# Patient Record
Sex: FEMALE | Race: OTHER
[De-identification: names, ages, dates, MRNs, and addresses within clinical notes are randomized per-mention and may not be internally consistent; named-entity substitution may affect disease eponyms.]

---

## 2017-02-19 ENCOUNTER — HOSPITAL ENCOUNTER (INPATIENT)
Dept: HOSPITAL 26 - MED | Age: 47
LOS: 1 days | Discharge: LEFT BEFORE DISCHARGE | DRG: 243 | End: 2017-02-20
Attending: INTERNAL MEDICINE | Admitting: INTERNAL MEDICINE
Payer: COMMERCIAL

## 2017-02-19 VITALS — DIASTOLIC BLOOD PRESSURE: 59 MMHG | SYSTOLIC BLOOD PRESSURE: 102 MMHG

## 2017-02-19 VITALS — DIASTOLIC BLOOD PRESSURE: 54 MMHG | SYSTOLIC BLOOD PRESSURE: 101 MMHG

## 2017-02-19 VITALS — DIASTOLIC BLOOD PRESSURE: 60 MMHG | SYSTOLIC BLOOD PRESSURE: 101 MMHG

## 2017-02-19 VITALS — DIASTOLIC BLOOD PRESSURE: 71 MMHG | SYSTOLIC BLOOD PRESSURE: 111 MMHG

## 2017-02-19 VITALS — DIASTOLIC BLOOD PRESSURE: 59 MMHG | SYSTOLIC BLOOD PRESSURE: 100 MMHG

## 2017-02-19 VITALS — DIASTOLIC BLOOD PRESSURE: 55 MMHG | SYSTOLIC BLOOD PRESSURE: 92 MMHG

## 2017-02-19 VITALS — DIASTOLIC BLOOD PRESSURE: 70 MMHG | SYSTOLIC BLOOD PRESSURE: 111 MMHG

## 2017-02-19 VITALS — BODY MASS INDEX: 21.86 KG/M2 | WEIGHT: 128.03 LBS | HEIGHT: 64 IN

## 2017-02-19 VITALS — SYSTOLIC BLOOD PRESSURE: 104 MMHG | DIASTOLIC BLOOD PRESSURE: 71 MMHG

## 2017-02-19 VITALS — DIASTOLIC BLOOD PRESSURE: 74 MMHG | SYSTOLIC BLOOD PRESSURE: 133 MMHG

## 2017-02-19 VITALS — DIASTOLIC BLOOD PRESSURE: 67 MMHG | SYSTOLIC BLOOD PRESSURE: 104 MMHG

## 2017-02-19 VITALS — SYSTOLIC BLOOD PRESSURE: 104 MMHG | DIASTOLIC BLOOD PRESSURE: 65 MMHG

## 2017-02-19 DIAGNOSIS — K70.30: ICD-10-CM

## 2017-02-19 DIAGNOSIS — K76.6: ICD-10-CM

## 2017-02-19 DIAGNOSIS — D62: ICD-10-CM

## 2017-02-19 DIAGNOSIS — K22.11: Primary | ICD-10-CM

## 2017-02-19 DIAGNOSIS — Z91.018: ICD-10-CM

## 2017-02-19 DIAGNOSIS — E87.6: ICD-10-CM

## 2017-02-19 DIAGNOSIS — Z53.21: ICD-10-CM

## 2017-02-19 DIAGNOSIS — Z88.6: ICD-10-CM

## 2017-02-19 DIAGNOSIS — K92.89: ICD-10-CM

## 2017-02-19 DIAGNOSIS — F10.21: ICD-10-CM

## 2017-02-19 DIAGNOSIS — Z79.1: ICD-10-CM

## 2017-02-19 DIAGNOSIS — I85.00: ICD-10-CM

## 2017-02-19 PROCEDURE — 30233N1 TRANSFUSION OF NONAUTOLOGOUS RED BLOOD CELLS INTO PERIPHERAL VEIN, PERCUTANEOUS APPROACH: ICD-10-PCS | Performed by: INTERNAL MEDICINE

## 2017-02-19 PROCEDURE — 0W3P8ZZ CONTROL BLEEDING IN GASTROINTESTINAL TRACT, VIA NATURAL OR ARTIFICIAL OPENING ENDOSCOPIC: ICD-10-PCS | Performed by: INTERNAL MEDICINE

## 2017-02-19 RX ADMIN — PANTOPRAZOLE SODIUM SCH MLS/HR: 40 INJECTION, POWDER, FOR SOLUTION INTRAVENOUS at 12:01

## 2017-02-19 RX ADMIN — PANTOPRAZOLE SODIUM SCH MLS/HR: 40 INJECTION, POWDER, FOR SOLUTION INTRAVENOUS at 20:39

## 2017-02-19 NOTE — NUR
ADMITTED PT FROM ER TO ICU BED 6, PT IS AWAKE, ALERT,AND ORIENTATED. BEDSIDE MONITOR SHOWS 
ST. PT ON ROOM AIR, NO S/S OF RESPIRATORY DISTRESS NOTED. LING SOUNDS CLEAR. ABDOMEN SOFT 
WITH ACTIVE BOWEL SOUND. SKIN INTACT, PT IS ABLE TO MOVE ALL HER EXTREMITIES. IV TO RIGHT AC 
#20 RUNNING D5 1/2 NS KCL 40 MEQ 1000 ML  ML/HR, SITE INTACT AND PATENT. PT ALSO HAS 
IV TO LEFT HAND #20, SALINE LOCKED. POC DISCUSSED WITH PT, PT VERBALIZED UNDERSTANDING. FULL 
CODE, NO FEVER. SIDE RAILS UPX2 WITH LOW BED POSITION, HOB ELEVATED 30 DEGREES. WILL 
CONTINUE TO MONITOR.

## 2017-02-19 NOTE — NUR
PT IS AWAKE, ALERT AND ORIENTATED. GI NURSES PRESENTED AT PT BEDSIDE TO GET PT READY FOR   
ESOPHAGOGASTRODUODENOSCOPY.

## 2017-02-19 NOTE — NUR
PT RESTING IN BED. TALKING WITH HER FRIEND. NO S/S OF RESPIRATORY DISTRESS NOTED. CALL LIGHT 
IN REACH. SAFETY MAINTAINED. WILL CONTINUE TO MONITOR.

## 2017-02-19 NOTE — NUR
PATIENT REPORTS "FEELING ANTSY". PAGED DR. ARANDA, WHO IS ON CALL FOR DR. PIERRE, FOR 
ORDERS. WILL WAIT FOR CALL BACK.

## 2017-02-19 NOTE — NUR
STARTED PT ON BLOOD TRANSFUSION. VERIFIED WITH CHARGE NURSE VICTOR M HEATH. ORAL TEMP CHECKED 99 
F DEGREE. PT DENIES ANY PAIN OR DISCOMFORT AT THIS TIME.

## 2017-02-19 NOTE — NUR
RECEIVED BEDSIDE REPORT FROM RASTA HEATH. PATIENT IS ALERT, AWAKE, AND WATCHING TV. NO SIGNS OF 
RESPIRATORY DISTRESS OR SOB NOTED. PATIENT IS ON ROOM AIR WITH OXYGEN SATURATION %. 
VITAL SIGNS ARE STABLE. NO REPORTS OF PAIN OR DISCOMFORT AT THIS TIME. BREATH SOUNDS ARE 
CLEAR UPON AUSCULTATION. BOWEL SOUNDS ARE ACTIVE. THERE IS A #20 IN THE PATIENT'S RIGHT AC 
RECEIVING PROTONIX 80MG IN NORMAL SALINE 0.9% AT 10 ML/HR AND 0.30% POTASSIUM CHLORIDE IN 
D5% AND 1/2 NS AT 50 ML/HR. SITE IS DRY, INTACT, AND PATENT. THERE IS ALSO A #20 IN THE LEFT 
HAND SALINE LOCK. SITE IS DRY, INTACT, AND ASYMPTOMATIC. SCDS ARE IN PLACE FOR VTE 
PROPHYLAXIS. EXPLAINED PLAN OF CARE TONIGHT TO PATIENT TO INCLUDE MONITORING, VITAL SIGNS, 
ADMINISTRATION OF PRBCS RELATED TO DX OF SEVERE SYMPTOMATIC ANEMIA. PATIENT VERBALIZED 
UNDERSTANDING. PATIENT'S NEEDS MET AT THIS TIME. CALL LIGHT WITHIN REACH. HOB AT 30 DEGREES 
WITH BED IN LOW POSITION. SIDE RAILS UP X2. WILL CONTINUE TO MONITOR PATIENT.

## 2017-02-19 NOTE — NUR
IS STILL HERE AND MADE AWARE OF PATIENT STILL HAS IV FLUID WITH 1000 ML OF D5/0.45 
NS + KCL 40 MEQ INFUSING.  SAID THAT TO DECREASE PRESENT IV FLUID TO 50 ML/HR UNTIL 
IT IS DONE THEN KEEP NS TKO RATE .

## 2017-02-19 NOTE — NUR
SPOKE TO DR. ARANDA. NEW ORDERS GIVEN. ATIVAN 1MG IVP PRN Q6H FOR 
ANXIETY/RESTLESS/INSOMNIA. WILL FOLLOW UP WITH NEW DOCTOR'S ORDERS.

## 2017-02-19 NOTE — NUR
FIRST BAG OF BLOOD TRANSFUSION IS COMPLETED. PT TOLERATED WELL, NO SIGNS OF BLOOD REACTIONS. 
CHEST X-RAY DONE AT THIS TIME.

## 2017-02-19 NOTE — NUR
PATIENT REQUESTED SNACK. PROVIDED PATIENT WITH 1 BOX OF APPLE JUICE AND 1 CUP OF JELLO. 
PATIENT'S NEEDS MET AT THIS TIME. CONTINUE TO MONITOR.

## 2017-02-19 NOTE — NUR
STARTED PT ON PROTONIX 80 MG IN  ML DRIP AT 10 MLS/HR. NO S/S OF RESPIRATORY DISTRESS 
NOTED, WILL CONTINUE TO MONITOR.

## 2017-02-19 NOTE — NUR
Patient will be admitted to care of DR. DAVIS. Admited to ICU 6.  Will go 
to room 6. Belongings list completed.  Report to RASTA HEATH

## 2017-02-19 NOTE — NUR
CALLED BLOOD BANK TO CHECK IF ANOTHER 2 UNIT OF PACKED CELLS IS READY YET.  PER  BLOOD BANK 
PERSONAL : STILL WAITING FOR THE BLOOD TO DELIVERING FROM [facility] .  WILL 
ENDORSE TO NIGHT SHIFT NURSE.

## 2017-02-19 NOTE — NUR
THIRD UNIT OF PRBC COMPLETE. VITALS ARE WNL. NO REPORTS OF PAIN OR DISCOMFORT. CONTINUE TO 
MONITOR.

## 2017-02-19 NOTE — NUR
PATIENT REQUESTED SNACK. PROVIDED 1 CUP OF JELLO AND 1 CUP OF CHICKEN BROTH. PATIENT'S NEEDS 
MET AT THIS TIME. HOB AT 30 DEGREES WTIH BED IN LOW POSITION. CONTINUE TO MONITOR PATIENT.

## 2017-02-19 NOTE — NUR
PT AWAKE, ALERT,AND ORIENTATED.  CAME IN AT 0945 TO EXPLAIN EGD PROCEDURE TO 
PT. PT VERBALIZED UNDERSTANDING AND AGREED FOR PROCEDURE. PT SIGNED EGD CONSENT AT 0948 , GI 
NURSES PRESENTED AS WITNESS.

## 2017-02-19 NOTE — NUR
WAS NOTIFIED OF THE PATIENT ADMISSION AND UPDATED IN PATIENT'S CONDITION WHICH 
INCLUDED THE REPORT OF POST EGD PROCEDURE. ALSO  MADE AWARE OF PATIENT IS GOING TO 
HAVE 4 UNIT OF PRBC  AND THE BLOOD IS NOT READY YET. PER :WILL SEE THE PATIENT IN ONE 
HOUR.

## 2017-02-19 NOTE — NUR
PATIENT VOIDED 300ML OF LIGHT ROSAMARIA COLORED URINE. PERINEAL CARE PROVIDED. PATIENT'S NEEDS 
MET AT THIS TIME. CALL LIGHT WITHIN REACH.

## 2017-02-19 NOTE — NUR
FOURTH UNIT OF PRBC INITIATED. VITALS ARE STABLE WITH NO REPORTS OF PAIN OR DISCOMFORT. 
CONTINUE TO MONITOR.

## 2017-02-19 NOTE — NUR
PT AWAKE, ALERT, ORIENTED. THE SECOND BAG OF BLOOD TRANSFUSION IS COMPLETED.  PT TOLERATED 
WELL. NO S/S OF RESPIRATORY NOTED. WILL CONTINUE TO MONITOR.

## 2017-02-19 NOTE — NUR
PAGED DR. ARANDA  TO RELAY ABNORMAL LAB RESULTS TAKEN AT 2000H; ORDERED TO GO ON 
TRANSFUSING 2 UNITS PRBC AND TO GIVE PATIENT ORAL POTASSIUM SUPPLEMENT X 1 DOSE; CARRIED 
OUT.

## 2017-02-19 NOTE — NUR
ADMITTED PT TO ICU BED 6, PT IS AWAKE, ALERT, AND ORIENTATED. BEDSIDE MONITOR 
SHOWS ST. ON ROOM AIR, NO S/S OF RESPIRATORY DISTRESS NOTED. LUNG SOUNDS CLEAR, 
ABDOMEN SOFT WITH ACTIVE BOWEL SOUND. IV TO RIGHT AC #20 RUNNING D5+1/2 NS WITH 
40 MEQ KCL  ML /HR. SITE INTACT AND PATENT. SKIN INTACT, PT IS ABLE TO 
MOVE ALL HER EXTREMITIES. SIDE RAILS UPX2 WITH LOW BED POSITION, HOB ELEVATED 
30 DEGREES, POC DISCUSSED WITH PT, PT VERBALIZED UNDERSTANDING. CALL LIGHT IN 
REACH, WILL CONTINUE TO MONITOR.

## 2017-02-19 NOTE — NUR
PT BIBA FOR EVALUATION OF VOMITING LAST NOC. PT STATES SHE HAS NOT VOMITED THIS 
AM, JUST LAST NOC. HX CIRRHOSIS, ANXIETY; SKIN IS PINK/WARM/DRY; AAOX4 WITH 
EVEN AND STEADY GAIT; LUNGS CLEAR BL; HR EVEN AND REGULAR; PT DENIES ANY FEVER, 
CP, SOB, OR COUGH AT THIS TIME; PATIENT STATES PAIN OF 0/10 AT THIS TIME; VSS; 
PATIENT POSITIONED FOR COMFORT; HOB ELEVATED; BEDRAILS UP X2; BED DOWN. ER MD 
MADE AWARE OF PT STATUS.

## 2017-02-20 VITALS — DIASTOLIC BLOOD PRESSURE: 78 MMHG | SYSTOLIC BLOOD PRESSURE: 126 MMHG

## 2017-02-20 VITALS — DIASTOLIC BLOOD PRESSURE: 64 MMHG | SYSTOLIC BLOOD PRESSURE: 106 MMHG

## 2017-02-20 VITALS — SYSTOLIC BLOOD PRESSURE: 108 MMHG | DIASTOLIC BLOOD PRESSURE: 63 MMHG

## 2017-02-20 VITALS — DIASTOLIC BLOOD PRESSURE: 75 MMHG | SYSTOLIC BLOOD PRESSURE: 102 MMHG

## 2017-02-20 VITALS — SYSTOLIC BLOOD PRESSURE: 105 MMHG | DIASTOLIC BLOOD PRESSURE: 71 MMHG

## 2017-02-20 VITALS — SYSTOLIC BLOOD PRESSURE: 96 MMHG | DIASTOLIC BLOOD PRESSURE: 63 MMHG

## 2017-02-20 VITALS — DIASTOLIC BLOOD PRESSURE: 57 MMHG | SYSTOLIC BLOOD PRESSURE: 96 MMHG

## 2017-02-20 VITALS — SYSTOLIC BLOOD PRESSURE: 100 MMHG | DIASTOLIC BLOOD PRESSURE: 66 MMHG

## 2017-02-20 VITALS — DIASTOLIC BLOOD PRESSURE: 63 MMHG | SYSTOLIC BLOOD PRESSURE: 90 MMHG

## 2017-02-20 RX ADMIN — PANTOPRAZOLE SODIUM SCH MLS/HR: 40 INJECTION, POWDER, FOR SOLUTION INTRAVENOUS at 06:39

## 2017-02-20 NOTE — NUR
WAS NOTIFIED PT LEFT AMA.

-------------------------------------------------------------------------------

Addendum: 02/20/17 at 1819 by Gigi Peres RN

-------------------------------------------------------------------------------

ERROR IN TIME. 1650 INSTEAD OF 1450

## 2017-02-20 NOTE — NUR
FOURTH UNIT OF PRBC COMPLETED. VITALS ARE STABLE. NO SIGNS OF SOB OR DISCOMFORT NOTED. 
PATIENT SLEEPING AT THIS TIME. CALL LIGHT WITHIN REACH. CONTINUE TO MONITOR.

## 2017-02-20 NOTE — NUR
CM NOTE

AS PER  VISH EXT 1145, REVIEWS SHOULD ONLY GO TO [facility]. SENT 
INITIAL REVIEW TO [facility] FAX# [phone] PH# [phone]

## 2017-02-20 NOTE — NUR
PT LEFT ICU, AMA. ACCOMPANIED BY ADRIANA. NOTIFIED NURSE SUPERVISOR KATHARINE AND ADMITTING 
OFFICE. /85. . O2 98%.NO S/S OF RESPIRATORY DISTRESS NOTED AT THIS TIME.

-------------------------------------------------------------------------------

Addendum: 02/20/17 at 1715 by Gigi Peres RN

-------------------------------------------------------------------------------

PRESCRIPTION GIVEN. MEDICATION EDUCATION , SIDE EFFECTS AND PURPOSE OF MED GIVEN. ARMBANDS 
ARE REMOVED.

## 2017-02-20 NOTE — NUR
PATIENT ASLEEP IN BED. NO S/S OF SOB OR RESPIRATORY DISTRESS NOTED. CALL LIGHT WITHIN REACH. 
CONTINUE TO MONITOR PATIENT.

## 2017-02-20 NOTE — NUR
RECEIVED BEDSIDE REPORT FROM TAYLOR HEATH. PT IS SLEEPING BUT EASILY AWAKING BY INITIAL 
ASSESSMENT. BEDSIDE MONITOR SHOWS SR. NO S/S OF RESPIRATORY DISTRESS NOTED, LUNG SOUNDS 
CLEAR. ABDOMEN SOFT WITH ACTIVE BOWEL SOUNDS. SKIN INTACT, PT CAN MOVE ALL HER EXTREMITIES. 
SCDS IN PLACE. IV TO RIGHT AC RECEIVING PROTONIX 80 MG IN  AT 10 ML/HR. THIS IS ALSO A 
# 20 IV TO LEFT HAND, SALINE LOCKED. SITE INTACT AND PATENT. SIDE RAILS UP X2 WITH LOW BED 
POSITION, HOB ELEVATED 30 DEGREES. CALL LIGHT IN REACH.WILL CONTINUE TO MONITOR.

## 2017-02-20 NOTE — NUR
PATIENT HAS BEEN SCREENED AND CATEGORIZED AS MODERATE NUTRITION RISK. PATIENT WILL BE SEEN 
WITHIN 3-5 DAYS OF ADMISSION.



2/21/17-2/23/17



[address]GA [address] RD

## 2017-02-20 NOTE — NUR
ROUNDED ON PATIENT. PATIENT SLEEPING IN BED. NO SIGNS OF SOB OR DISCOMFORT NOTED. HOB AT 30 
DEGREES WITH BED IN LOW POSITION. CALL LIGHT WITHIN REACH. WILL CONTINUE TO MONITOR PATIENT.

## 2017-02-20 NOTE — NUR
PATIENT SLEEPING AND IS STABLE. ALL PATIENT'S NEEDS ATTENDED TO DURING SHIFT. ENDORSED 
CONTINUITY OF CARE TO RASTA HEATH.

## 2017-02-21 ENCOUNTER — HOSPITAL ENCOUNTER (EMERGENCY)
Dept: HOSPITAL 26 - MED | Age: 47
Discharge: HOME | End: 2017-02-21
Payer: COMMERCIAL

## 2017-02-21 VITALS — WEIGHT: 130 LBS | BODY MASS INDEX: 22.2 KG/M2 | HEIGHT: 64 IN

## 2017-02-21 VITALS — SYSTOLIC BLOOD PRESSURE: 127 MMHG | DIASTOLIC BLOOD PRESSURE: 87 MMHG

## 2017-02-21 VITALS — DIASTOLIC BLOOD PRESSURE: 80 MMHG | SYSTOLIC BLOOD PRESSURE: 128 MMHG

## 2017-02-21 DIAGNOSIS — K70.10: ICD-10-CM

## 2017-02-21 DIAGNOSIS — R18.8: ICD-10-CM

## 2017-02-21 DIAGNOSIS — Z90.49: ICD-10-CM

## 2017-02-21 DIAGNOSIS — K92.2: Primary | ICD-10-CM

## 2017-02-21 DIAGNOSIS — D64.9: ICD-10-CM

## 2017-02-21 DIAGNOSIS — Z88.5: ICD-10-CM

## 2017-02-21 PROCEDURE — 82150 ASSAY OF AMYLASE: CPT

## 2017-02-21 PROCEDURE — 83690 ASSAY OF LIPASE: CPT

## 2017-02-21 PROCEDURE — 81001 URINALYSIS AUTO W/SCOPE: CPT

## 2017-02-21 PROCEDURE — 96365 THER/PROPH/DIAG IV INF INIT: CPT

## 2017-02-21 PROCEDURE — 80053 COMPREHEN METABOLIC PANEL: CPT

## 2017-02-21 PROCEDURE — 81025 URINE PREGNANCY TEST: CPT

## 2017-02-21 PROCEDURE — 96361 HYDRATE IV INFUSION ADD-ON: CPT

## 2017-02-21 PROCEDURE — 85025 COMPLETE CBC W/AUTO DIFF WBC: CPT

## 2017-02-21 PROCEDURE — 74176 CT ABD & PELVIS W/O CONTRAST: CPT

## 2017-02-21 PROCEDURE — 96366 THER/PROPH/DIAG IV INF ADDON: CPT

## 2017-02-21 PROCEDURE — 36415 COLL VENOUS BLD VENIPUNCTURE: CPT

## 2017-02-21 PROCEDURE — 99285 EMERGENCY DEPT VISIT HI MDM: CPT

## 2017-02-21 PROCEDURE — 84703 CHORIONIC GONADOTROPIN ASSAY: CPT

## 2017-02-21 NOTE — NUR
Patient discharged with v/s stable. Written and verbal after care instructions 
given and explained. Patient alert, oriented and verbalized understanding of 
instructions. Ambulatory with steady gait. All questions addressed prior to 
discharge. ID band removed. Patient advised to follow up with PMD. Rx of 
PROTONIX,ALDACTONE AND LASIX given. Patient educated on indication of 
medication including possible reaction and side effects.ADVISED PT TO AVOID 
ALCOHOL CONSUMPTION AND PT AGREED TO IT. Opportunity to ask questions provided 
and answered.

## 2017-02-21 NOTE — NUR
pt up to restroom, states does not feeling like voiding but will attempt.  
denies dizziness, no ha, no n/v, no abdominal pain at this time. awaits ct

## 2017-02-21 NOTE — NUR
DR CODY SPOKE TO PT.PT WANTS TO GO HOME.PT STATES DR CODY SAID "THERE'S 
NO MUCH WATER TO DRAIN AND  WILL PRESCRIBED MEDICATION".PT WAS ADVISED TO 
FOLLOW UP WITH PMD AND RETURN TO ER IF SYMPOMS GETS WORST.

## 2017-02-21 NOTE — NUR
PT LYING ON BED COMFORTABLY.BOYFRIEND AT BEDSIDE.NO ACUTE DISTRESS NOTED AT 
THIS TIME.PT PLAYING HER CELLPHONE.ALL MONITORS PLACED IN. IV PROTONIX STILL 
INFUSING.NEEDS ATTENDED. WILL CONTINUE TO MONITOR PT.

## 2017-02-21 NOTE — NUR
PT BIB EMS FROM HOME FOR ABDOMINAL DISTENSION AND SOB. SIGNED OUT AMA FROM ICU 
YESTERDAY, AFTER RECEIVING 4 UNITS OF BLOOD.PT DENIES ANY PAIN AT THIS TIME.PT 
STATES"I VOMITTED FRESH BLOOD LAST SUNDAY AND MY POOP IS BLACK".PT CLAIMS THAT 
SHE HAS HX OF CIRRHOSIS.PT IS AAOX4.W/ STRETCH AL ON THE ABDOMEN.HOB 
ELEVATED, SAFETY PRECAUTION INSTITUTED.NEEDS ATTENDED. DR CODY MADE AWARE 
OF PT'S CONDITION.

## 2022-11-22 ENCOUNTER — HOSPITAL ENCOUNTER (EMERGENCY)
Dept: HOSPITAL 26 - MED | Age: 52
Discharge: HOME | End: 2022-11-22
Payer: MEDICAID

## 2022-11-22 VITALS — BODY MASS INDEX: 22.49 KG/M2 | HEIGHT: 65 IN | WEIGHT: 135 LBS

## 2022-11-22 VITALS — DIASTOLIC BLOOD PRESSURE: 80 MMHG | SYSTOLIC BLOOD PRESSURE: 129 MMHG

## 2022-11-22 VITALS — DIASTOLIC BLOOD PRESSURE: 81 MMHG | SYSTOLIC BLOOD PRESSURE: 127 MMHG

## 2022-11-22 DIAGNOSIS — Z88.5: ICD-10-CM

## 2022-11-22 DIAGNOSIS — J06.9: Primary | ICD-10-CM

## 2022-11-22 DIAGNOSIS — R42: ICD-10-CM

## 2022-11-22 DIAGNOSIS — Z79.899: ICD-10-CM

## 2022-11-22 DIAGNOSIS — Z20.822: ICD-10-CM

## 2022-11-22 NOTE — NUR
Patient discharged with v/s stable. Written and verbal after care instructions 
ABOUT UPPER RESPIRATORY INFECTION AND VERTIGO given and explained. 

Patient alert, oriented and verbalized understanding of instructions. 
Ambulatory with steady gait. All questions addressed prior to discharge. ID 
band removed. Patient advised to follow up with PMD. Rx of FLONASE, ANTIVERT, 
AND ZOFRAN given. Patient educated on indication of medication including 
possible reaction and side effects. Opportunity to ask questions provided and 
answered.

## 2022-12-11 ENCOUNTER — HOSPITAL ENCOUNTER (INPATIENT)
Dept: HOSPITAL 26 - MED | Age: 52
LOS: 3 days | Discharge: HOME | DRG: 241 | End: 2022-12-14
Attending: STUDENT IN AN ORGANIZED HEALTH CARE EDUCATION/TRAINING PROGRAM | Admitting: STUDENT IN AN ORGANIZED HEALTH CARE EDUCATION/TRAINING PROGRAM
Payer: MEDICAID

## 2022-12-11 VITALS — WEIGHT: 151 LBS | BODY MASS INDEX: 25.78 KG/M2 | HEIGHT: 64 IN

## 2022-12-11 VITALS — SYSTOLIC BLOOD PRESSURE: 116 MMHG | DIASTOLIC BLOOD PRESSURE: 70 MMHG

## 2022-12-11 DIAGNOSIS — Z20.822: ICD-10-CM

## 2022-12-11 DIAGNOSIS — E83.51: ICD-10-CM

## 2022-12-11 DIAGNOSIS — I85.10: ICD-10-CM

## 2022-12-11 DIAGNOSIS — D69.6: ICD-10-CM

## 2022-12-11 DIAGNOSIS — F10.10: ICD-10-CM

## 2022-12-11 DIAGNOSIS — D61.818: ICD-10-CM

## 2022-12-11 DIAGNOSIS — E87.6: ICD-10-CM

## 2022-12-11 DIAGNOSIS — K22.10: ICD-10-CM

## 2022-12-11 DIAGNOSIS — K70.30: ICD-10-CM

## 2022-12-11 DIAGNOSIS — E43: ICD-10-CM

## 2022-12-11 DIAGNOSIS — Z88.5: ICD-10-CM

## 2022-12-11 DIAGNOSIS — D50.9: ICD-10-CM

## 2022-12-11 DIAGNOSIS — K76.6: ICD-10-CM

## 2022-12-11 DIAGNOSIS — F17.210: ICD-10-CM

## 2022-12-11 DIAGNOSIS — E87.0: ICD-10-CM

## 2022-12-11 DIAGNOSIS — E86.0: ICD-10-CM

## 2022-12-11 DIAGNOSIS — K21.00: ICD-10-CM

## 2022-12-11 DIAGNOSIS — K29.70: Primary | ICD-10-CM
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
## 2022-12-11 LAB
ALBUMIN FLD-MCNC: 2.3 G/DL (ref 3.4–5)
ANION GAP SERPL CALCULATED.3IONS-SCNC: 10.4 MMOL/L (ref 8–16)
APPEARANCE UR: CLEAR
AST SERPL-CCNC: 37 U/L (ref 15–37)
BASOPHILS # BLD AUTO: 0 K/UL (ref 0–0.22)
BASOPHILS NFR BLD AUTO: 0.8 % (ref 0–2)
BILIRUB SERPL-MCNC: 1.1 MG/DL (ref 0–1)
BILIRUB UR QL STRIP: (no result)
BUN SERPL-MCNC: 11 MG/DL (ref 7–18)
CHLORIDE SERPL-SCNC: 111 MMOL/L (ref 98–107)
CO2 SERPL-SCNC: 26.7 MMOL/L (ref 21–32)
COLOR UR: YELLOW
CREAT SERPL-MCNC: 0.7 MG/DL (ref 0.6–1.3)
EOSINOPHIL # BLD AUTO: 0.2 K/UL (ref 0–0.4)
EOSINOPHIL NFR BLD AUTO: 7.1 % (ref 0–4)
ERYTHROCYTE [DISTWIDTH] IN BLOOD BY AUTOMATED COUNT: 19.8 % (ref 11.6–13.7)
GFR SERPL CREATININE-BSD FRML MDRD: 113 ML/MIN (ref 90–?)
GLUCOSE SERPL-MCNC: 106 MG/DL (ref 74–106)
GLUCOSE UR STRIP-MCNC: NEGATIVE MG/DL
HCT VFR BLD AUTO: 19.4 % (ref 36–48)
HGB BLD-MCNC: 6.1 G/DL (ref 12–16)
HGB UR QL STRIP: NEGATIVE
LEUKOCYTE ESTERASE UR QL STRIP: NEGATIVE
LIPASE SERPL-CCNC: 161 U/L (ref 73–393)
LYMPHOCYTES # BLD AUTO: 0.9 K/UL (ref 2.5–16.5)
LYMPHOCYTES NFR BLD AUTO: 33.2 % (ref 20.5–51.1)
MCH RBC QN AUTO: 25 PG (ref 27–31)
MCHC RBC AUTO-ENTMCNC: 31 G/DL (ref 33–37)
MCV RBC AUTO: 81.1 FL (ref 80–94)
MONOCYTES # BLD AUTO: 0.3 K/UL (ref 0.8–1)
MONOCYTES NFR BLD AUTO: 11.3 % (ref 1.7–9.3)
NEUTROPHILS # BLD AUTO: 1.3 K/UL (ref 1.8–7.7)
NEUTROPHILS NFR BLD AUTO: 47.6 % (ref 42.2–75.2)
NITRITE UR QL STRIP: NEGATIVE
PH UR STRIP: 8.5 [PH] (ref 5–9)
PLATELET # BLD AUTO: 63 K/UL (ref 140–450)
POTASSIUM SERPL-SCNC: 3.1 MMOL/L (ref 3.5–5.1)
PROTHROMBIN TIME: 13.4 SECS (ref 10.8–13.4)
RBC # BLD AUTO: 2.4 MIL/UL (ref 4.2–5.4)
SODIUM SERPL-SCNC: 145 MMOL/L (ref 136–145)
WBC # BLD AUTO: 2.7 K/UL (ref 4.8–10.8)

## 2022-12-11 PROCEDURE — 30233N1 TRANSFUSION OF NONAUTOLOGOUS RED BLOOD CELLS INTO PERIPHERAL VEIN, PERCUTANEOUS APPROACH: ICD-10-PCS | Performed by: STUDENT IN AN ORGANIZED HEALTH CARE EDUCATION/TRAINING PROGRAM

## 2022-12-11 PROCEDURE — P9016 RBC LEUKOCYTES REDUCED: HCPCS

## 2022-12-11 PROCEDURE — C9113 INJ PANTOPRAZOLE SODIUM, VIA: HCPCS

## 2022-12-11 RX ADMIN — PANTOPRAZOLE SODIUM SCH MLS/HR: 40 INJECTION, POWDER, FOR SOLUTION INTRAVENOUS at 22:05

## 2022-12-12 VITALS — DIASTOLIC BLOOD PRESSURE: 53 MMHG | SYSTOLIC BLOOD PRESSURE: 101 MMHG

## 2022-12-12 VITALS — SYSTOLIC BLOOD PRESSURE: 112 MMHG | DIASTOLIC BLOOD PRESSURE: 64 MMHG

## 2022-12-12 VITALS — SYSTOLIC BLOOD PRESSURE: 102 MMHG | DIASTOLIC BLOOD PRESSURE: 67 MMHG

## 2022-12-12 VITALS — DIASTOLIC BLOOD PRESSURE: 60 MMHG | SYSTOLIC BLOOD PRESSURE: 102 MMHG

## 2022-12-12 LAB
ALBUMIN FLD-MCNC: 2 G/DL (ref 3.4–5)
AMYLASE SERPL-CCNC: 57 U/L (ref 25–115)
ANION GAP SERPL CALCULATED.3IONS-SCNC: 10.9 MMOL/L (ref 8–16)
ANION GAP SERPL CALCULATED.3IONS-SCNC: 8.5 MMOL/L (ref 8–16)
AST SERPL-CCNC: 32 U/L (ref 15–37)
BASOPHILS # BLD AUTO: 0 K/UL (ref 0–0.22)
BASOPHILS NFR BLD AUTO: 0.7 % (ref 0–2)
BILIRUB SERPL-MCNC: 2.1 MG/DL (ref 0–1)
BUN SERPL-MCNC: 12 MG/DL (ref 7–18)
BUN SERPL-MCNC: 12 MG/DL (ref 7–18)
CHLORIDE SERPL-SCNC: 112 MMOL/L (ref 98–107)
CHLORIDE SERPL-SCNC: 113 MMOL/L (ref 98–107)
CHOLEST/HDLC SERPL: 1.6 {RATIO} (ref 1–4.5)
CO2 SERPL-SCNC: 27.3 MMOL/L (ref 21–32)
CO2 SERPL-SCNC: 28.8 MMOL/L (ref 21–32)
CREAT SERPL-MCNC: 0.7 MG/DL (ref 0.6–1.3)
CREAT SERPL-MCNC: 0.7 MG/DL (ref 0.6–1.3)
EOSINOPHIL # BLD AUTO: 0.1 K/UL (ref 0–0.4)
EOSINOPHIL NFR BLD AUTO: 5.9 % (ref 0–4)
ERYTHROCYTE [DISTWIDTH] IN BLOOD BY AUTOMATED COUNT: 17.3 % (ref 11.6–13.7)
GFR SERPL CREATININE-BSD FRML MDRD: 113 ML/MIN (ref 90–?)
GFR SERPL CREATININE-BSD FRML MDRD: 113 ML/MIN (ref 90–?)
GLUCOSE SERPL-MCNC: 75 MG/DL (ref 74–106)
GLUCOSE SERPL-MCNC: 79 MG/DL (ref 74–106)
HCT VFR BLD AUTO: 20.6 % (ref 36–48)
HDLC SERPL-MCNC: 55 MG/DL (ref 40–60)
HGB BLD-MCNC: 6.7 G/DL (ref 12–16)
IRON SERPL-MCNC: 29 UG/DL (ref 50–175)
LDLC SERPL CALC-MCNC: 26 MG/DL (ref 60–100)
LIPASE SERPL-CCNC: 114 U/L (ref 73–393)
LYMPHOCYTES # BLD AUTO: 0.8 K/UL (ref 2.5–16.5)
LYMPHOCYTES NFR BLD AUTO: 37.6 % (ref 20.5–51.1)
MAGNESIUM SERPL-MCNC: 1.8 MG/DL (ref 1.8–2.4)
MCH RBC QN AUTO: 26 PG (ref 27–31)
MCHC RBC AUTO-ENTMCNC: 32 G/DL (ref 33–37)
MCV RBC AUTO: 80.3 FL (ref 80–94)
MONOCYTES # BLD AUTO: 0.2 K/UL (ref 0.8–1)
MONOCYTES NFR BLD AUTO: 10.1 % (ref 1.7–9.3)
NEUTROPHILS # BLD AUTO: 0.9 K/UL (ref 1.8–7.7)
NEUTROPHILS NFR BLD AUTO: 45.7 % (ref 42.2–75.2)
PHOSPHATE SERPL-MCNC: 3.2 MG/DL (ref 2.5–4.9)
PLATELET # BLD AUTO: 48 K/UL (ref 140–450)
POTASSIUM SERPL-SCNC: 3.2 MMOL/L (ref 3.5–5.1)
POTASSIUM SERPL-SCNC: 3.3 MMOL/L (ref 3.5–5.1)
PROTHROMBIN TIME: 14.2 SECS (ref 10.8–13.4)
RBC # BLD AUTO: 2.56 MIL/UL (ref 4.2–5.4)
SODIUM SERPL-SCNC: 147 MMOL/L (ref 136–145)
SODIUM SERPL-SCNC: 147 MMOL/L (ref 136–145)
T4 FREE SERPL-MCNC: 1.03 NG/DL (ref 0.76–1.46)
TIBC SERPL-MCNC: 227 UG/DL (ref 250–450)
TRIGL SERPL-MCNC: 49 MG/DL (ref 30–150)
TSH SERPL DL<=0.05 MIU/L-ACNC: 2.59 UIU/ML (ref 0.34–3.74)
WBC # BLD AUTO: 2 K/UL (ref 4.8–10.8)

## 2022-12-12 RX ADMIN — SODIUM CHLORIDE SCH MLS/HR: 9 INJECTION, SOLUTION INTRAVENOUS at 09:20

## 2022-12-12 RX ADMIN — SODIUM CHLORIDE PRN MG: 9 INJECTION, SOLUTION INTRAVENOUS at 23:39

## 2022-12-12 RX ADMIN — PANTOPRAZOLE SODIUM SCH MLS/HR: 40 INJECTION, POWDER, FOR SOLUTION INTRAVENOUS at 18:05

## 2022-12-12 RX ADMIN — PANTOPRAZOLE SODIUM SCH MLS/HR: 40 INJECTION, POWDER, FOR SOLUTION INTRAVENOUS at 10:25

## 2022-12-12 RX ADMIN — DOCUSATE SODIUM SCH MG: 100 CAPSULE, LIQUID FILLED ORAL at 20:43

## 2022-12-13 VITALS — SYSTOLIC BLOOD PRESSURE: 132 MMHG | DIASTOLIC BLOOD PRESSURE: 81 MMHG

## 2022-12-13 VITALS — SYSTOLIC BLOOD PRESSURE: 104 MMHG | DIASTOLIC BLOOD PRESSURE: 61 MMHG

## 2022-12-13 VITALS — DIASTOLIC BLOOD PRESSURE: 71 MMHG | SYSTOLIC BLOOD PRESSURE: 116 MMHG

## 2022-12-13 VITALS — DIASTOLIC BLOOD PRESSURE: 55 MMHG | SYSTOLIC BLOOD PRESSURE: 109 MMHG

## 2022-12-13 VITALS — SYSTOLIC BLOOD PRESSURE: 159 MMHG | DIASTOLIC BLOOD PRESSURE: 87 MMHG

## 2022-12-13 VITALS — DIASTOLIC BLOOD PRESSURE: 66 MMHG | SYSTOLIC BLOOD PRESSURE: 108 MMHG

## 2022-12-13 LAB
ANION GAP SERPL CALCULATED.3IONS-SCNC: 10 MMOL/L (ref 8–16)
BASOPHILS # BLD AUTO: 0 K/UL (ref 0–0.22)
BASOPHILS NFR BLD AUTO: 2.2 % (ref 0–2)
BUN SERPL-MCNC: 11 MG/DL (ref 7–18)
CHLORIDE SERPL-SCNC: 115 MMOL/L (ref 98–107)
CO2 SERPL-SCNC: 25.8 MMOL/L (ref 21–32)
CREAT SERPL-MCNC: 0.7 MG/DL (ref 0.6–1.3)
EOSINOPHIL # BLD AUTO: 0.1 K/UL (ref 0–0.4)
EOSINOPHIL NFR BLD AUTO: 4.5 % (ref 0–4)
ERYTHROCYTE [DISTWIDTH] IN BLOOD BY AUTOMATED COUNT: 18.1 % (ref 11.6–13.7)
FERRITIN SERPL-MCNC: 11 NG/ML (ref 15–150)
FOLATE SERPL-MCNC: 12.7 NG/ML (ref 3–?)
GFR SERPL CREATININE-BSD FRML MDRD: 113 ML/MIN (ref 90–?)
GLUCOSE SERPL-MCNC: 98 MG/DL (ref 74–106)
HCT VFR BLD AUTO: 24.2 % (ref 36–48)
HGB BLD-MCNC: 7.9 G/DL (ref 12–16)
LYMPHOCYTES # BLD AUTO: 0.6 K/UL (ref 2.5–16.5)
LYMPHOCYTES NFR BLD AUTO: 29.4 % (ref 20.5–51.1)
MAGNESIUM SERPL-MCNC: 1.8 MG/DL (ref 1.8–2.4)
MCH RBC QN AUTO: 27 PG (ref 27–31)
MCHC RBC AUTO-ENTMCNC: 33 G/DL (ref 33–37)
MCV RBC AUTO: 81.2 FL (ref 80–94)
MONOCYTES # BLD AUTO: 0.2 K/UL (ref 0.8–1)
MONOCYTES NFR BLD AUTO: 9.2 % (ref 1.7–9.3)
NEUTROPHILS # BLD AUTO: 1.1 K/UL (ref 1.8–7.7)
NEUTROPHILS NFR BLD AUTO: 54.7 % (ref 42.2–75.2)
PHOSPHATE SERPL-MCNC: 2.8 MG/DL (ref 2.5–4.9)
PLATELET # BLD AUTO: 41 K/UL (ref 140–450)
POTASSIUM SERPL-SCNC: 3.8 MMOL/L (ref 3.5–5.1)
RBC # BLD AUTO: 2.98 MIL/UL (ref 4.2–5.4)
SODIUM SERPL-SCNC: 147 MMOL/L (ref 136–145)
TRANSFERRIN SERPL-MCNC: 194 MG/DL (ref 192–364)
VIT B12 SERPL-MCNC: 1272 PG/ML (ref 211–946)
WBC # BLD AUTO: 2 K/UL (ref 4.8–10.8)

## 2022-12-13 PROCEDURE — 06L38CZ OCCLUSION OF ESOPHAGEAL VEIN WITH EXTRALUMINAL DEVICE, VIA NATURAL OR ARTIFICIAL OPENING ENDOSCOPIC: ICD-10-PCS

## 2022-12-13 RX ADMIN — SODIUM CHLORIDE SCH MLS/HR: 9 INJECTION, SOLUTION INTRAVENOUS at 04:38

## 2022-12-13 RX ADMIN — PANTOPRAZOLE SODIUM SCH MLS/HR: 40 INJECTION, POWDER, FOR SOLUTION INTRAVENOUS at 13:41

## 2022-12-13 RX ADMIN — DOCUSATE SODIUM SCH MG: 100 CAPSULE, LIQUID FILLED ORAL at 08:22

## 2022-12-13 RX ADMIN — SODIUM CHLORIDE PRN MG: 9 INJECTION, SOLUTION INTRAVENOUS at 22:21

## 2022-12-13 RX ADMIN — SODIUM CHLORIDE PRN MG: 9 INJECTION, SOLUTION INTRAVENOUS at 19:04

## 2022-12-13 RX ADMIN — LACTULOSE SCH GM: 10 SOLUTION ORAL at 21:00

## 2022-12-13 RX ADMIN — PROPRANOLOL HYDROCHLORIDE SCH MG: 20 TABLET ORAL at 21:00

## 2022-12-13 RX ADMIN — FERROUS SULFATE TAB 325 MG (65 MG ELEMENTAL FE) SCH MG: 325 (65 FE) TAB at 17:38

## 2022-12-13 RX ADMIN — PANTOPRAZOLE SODIUM SCH MLS/HR: 40 INJECTION, POWDER, FOR SOLUTION INTRAVENOUS at 04:37

## 2022-12-13 RX ADMIN — LANSOPRAZOLE SCH MG: 30 CAPSULE, DELAYED RELEASE ORAL at 17:37

## 2022-12-14 VITALS — SYSTOLIC BLOOD PRESSURE: 113 MMHG | DIASTOLIC BLOOD PRESSURE: 74 MMHG

## 2022-12-14 VITALS — DIASTOLIC BLOOD PRESSURE: 71 MMHG | SYSTOLIC BLOOD PRESSURE: 116 MMHG

## 2022-12-14 VITALS — SYSTOLIC BLOOD PRESSURE: 135 MMHG | DIASTOLIC BLOOD PRESSURE: 86 MMHG

## 2022-12-14 VITALS — SYSTOLIC BLOOD PRESSURE: 122 MMHG | DIASTOLIC BLOOD PRESSURE: 56 MMHG

## 2022-12-14 LAB
ANION GAP SERPL CALCULATED.3IONS-SCNC: 6 MMOL/L (ref 8–16)
BASOPHILS # BLD AUTO: 0 K/UL (ref 0–0.22)
BASOPHILS NFR BLD AUTO: 0.5 % (ref 0–2)
BUN SERPL-MCNC: 11 MG/DL (ref 7–18)
CHLORIDE SERPL-SCNC: 115 MMOL/L (ref 98–107)
CO2 SERPL-SCNC: 25.7 MMOL/L (ref 21–32)
CREAT SERPL-MCNC: 0.8 MG/DL (ref 0.6–1.3)
EOSINOPHIL # BLD AUTO: 0.2 K/UL (ref 0–0.4)
EOSINOPHIL NFR BLD AUTO: 6.3 % (ref 0–4)
ERYTHROCYTE [DISTWIDTH] IN BLOOD BY AUTOMATED COUNT: 18.5 % (ref 11.6–13.7)
GFR SERPL CREATININE-BSD FRML MDRD: 97 ML/MIN (ref 90–?)
GLUCOSE SERPL-MCNC: 88 MG/DL (ref 74–106)
HCT VFR BLD AUTO: 25.5 % (ref 36–48)
HGB BLD-MCNC: 8.3 G/DL (ref 12–16)
LYMPHOCYTES # BLD AUTO: 0.8 K/UL (ref 2.5–16.5)
LYMPHOCYTES NFR BLD AUTO: 27.4 % (ref 20.5–51.1)
MAGNESIUM SERPL-MCNC: 1.7 MG/DL (ref 1.8–2.4)
MCH RBC QN AUTO: 27 PG (ref 27–31)
MCHC RBC AUTO-ENTMCNC: 33 G/DL (ref 33–37)
MCV RBC AUTO: 81.2 FL (ref 80–94)
MONOCYTES # BLD AUTO: 0.3 K/UL (ref 0.8–1)
MONOCYTES NFR BLD AUTO: 10.9 % (ref 1.7–9.3)
NEUTROPHILS # BLD AUTO: 1.6 K/UL (ref 1.8–7.7)
NEUTROPHILS NFR BLD AUTO: 54.9 % (ref 42.2–75.2)
PHOSPHATE SERPL-MCNC: 2.9 MG/DL (ref 2.5–4.9)
PLATELET # BLD AUTO: 45 K/UL (ref 140–450)
POTASSIUM SERPL-SCNC: 3.7 MMOL/L (ref 3.5–5.1)
RBC # BLD AUTO: 3.13 MIL/UL (ref 4.2–5.4)
SODIUM SERPL-SCNC: 143 MMOL/L (ref 136–145)
WBC # BLD AUTO: 3 K/UL (ref 4.8–10.8)

## 2022-12-14 RX ADMIN — SODIUM CHLORIDE SCH MLS/HR: 9 INJECTION, SOLUTION INTRAVENOUS at 01:47

## 2022-12-14 RX ADMIN — PROPRANOLOL HYDROCHLORIDE SCH MG: 20 TABLET ORAL at 08:49

## 2022-12-14 RX ADMIN — FERROUS SULFATE TAB 325 MG (65 MG ELEMENTAL FE) SCH MG: 325 (65 FE) TAB at 08:49

## 2022-12-14 RX ADMIN — LANSOPRAZOLE SCH MG: 30 CAPSULE, DELAYED RELEASE ORAL at 08:49

## 2022-12-14 RX ADMIN — LACTULOSE SCH GM: 10 SOLUTION ORAL at 08:49
